# Patient Record
Sex: FEMALE | Race: WHITE | NOT HISPANIC OR LATINO | Employment: UNEMPLOYED | URBAN - METROPOLITAN AREA
[De-identification: names, ages, dates, MRNs, and addresses within clinical notes are randomized per-mention and may not be internally consistent; named-entity substitution may affect disease eponyms.]

---

## 2023-10-04 ENCOUNTER — LAB (OUTPATIENT)
Dept: LAB | Facility: LAB | Age: 33
End: 2023-10-04
Payer: COMMERCIAL

## 2023-10-04 DIAGNOSIS — G35 MULTIPLE SCLEROSIS (MULTI): ICD-10-CM

## 2023-10-04 DIAGNOSIS — D84.821 IMMUNODEFICIENCY DUE TO DRUGS (CODE) (MULTI): Primary | ICD-10-CM

## 2023-10-04 DIAGNOSIS — F41.8 OTHER SPECIFIED ANXIETY DISORDERS: ICD-10-CM

## 2023-10-04 DIAGNOSIS — Z11.59 ENCOUNTER FOR SCREENING FOR OTHER VIRAL DISEASES: ICD-10-CM

## 2023-10-04 LAB
25(OH)D3 SERPL-MCNC: 34 NG/ML (ref 30–100)
ALBUMIN SERPL BCP-MCNC: 4.6 G/DL (ref 3.4–5)
ALP SERPL-CCNC: 52 U/L (ref 33–110)
ALT SERPL W P-5'-P-CCNC: 14 U/L (ref 7–45)
ANION GAP SERPL CALC-SCNC: 14 MMOL/L (ref 10–20)
AST SERPL W P-5'-P-CCNC: 15 U/L (ref 9–39)
BASOPHILS # BLD AUTO: 0.07 X10*3/UL (ref 0–0.1)
BASOPHILS NFR BLD AUTO: 0.6 %
BILIRUB SERPL-MCNC: 0.7 MG/DL (ref 0–1.2)
BUN SERPL-MCNC: 11 MG/DL (ref 6–23)
CALCIUM SERPL-MCNC: 9.9 MG/DL (ref 8.6–10.6)
CHLORIDE SERPL-SCNC: 103 MMOL/L (ref 98–107)
CO2 SERPL-SCNC: 29 MMOL/L (ref 21–32)
CREAT SERPL-MCNC: 0.74 MG/DL (ref 0.5–1.05)
EOSINOPHIL # BLD AUTO: 0.05 X10*3/UL (ref 0–0.7)
EOSINOPHIL NFR BLD AUTO: 0.4 %
ERYTHROCYTE [DISTWIDTH] IN BLOOD BY AUTOMATED COUNT: 12.5 % (ref 11.5–14.5)
FOLATE SERPL-MCNC: 20 NG/ML
GFR SERPL CREATININE-BSD FRML MDRD: >90 ML/MIN/1.73M*2
GLUCOSE SERPL-MCNC: 90 MG/DL (ref 74–99)
HBV CORE AB SER QL: NONREACTIVE
HBV SURFACE AB SER-ACNC: >1000 MIU/ML
HBV SURFACE AG SERPL QL IA: NONREACTIVE
HCT VFR BLD AUTO: 38.9 % (ref 36–46)
HGB BLD-MCNC: 13.1 G/DL (ref 12–16)
IGA SERPL-MCNC: 248 MG/DL (ref 70–400)
IGG SERPL-MCNC: 984 MG/DL (ref 700–1600)
IGM SERPL-MCNC: 102 MG/DL (ref 40–230)
IMM GRANULOCYTES # BLD AUTO: 0.04 X10*3/UL (ref 0–0.7)
IMM GRANULOCYTES NFR BLD AUTO: 0.3 % (ref 0–0.9)
LYMPHOCYTES # BLD AUTO: 1.45 X10*3/UL (ref 1.2–4.8)
LYMPHOCYTES NFR BLD AUTO: 12.4 %
MCH RBC QN AUTO: 30 PG (ref 26–34)
MCHC RBC AUTO-ENTMCNC: 33.7 G/DL (ref 32–36)
MCV RBC AUTO: 89 FL (ref 80–100)
MONOCYTES # BLD AUTO: 0.85 X10*3/UL (ref 0.1–1)
MONOCYTES NFR BLD AUTO: 7.3 %
NEUTROPHILS # BLD AUTO: 9.2 X10*3/UL (ref 1.2–7.7)
NEUTROPHILS NFR BLD AUTO: 79 %
NRBC BLD-RTO: 0 /100 WBCS (ref 0–0)
PLATELET # BLD AUTO: 259 X10*3/UL (ref 150–450)
PMV BLD AUTO: 11.5 FL (ref 7.5–11.5)
POTASSIUM SERPL-SCNC: 3.4 MMOL/L (ref 3.5–5.3)
PROT SERPL-MCNC: 7.2 G/DL (ref 6.4–8.2)
RBC # BLD AUTO: 4.37 X10*6/UL (ref 4–5.2)
SODIUM SERPL-SCNC: 143 MMOL/L (ref 136–145)
T4 FREE SERPL-MCNC: 1.11 NG/DL (ref 0.78–1.48)
TSH SERPL-ACNC: 0.37 MIU/L (ref 0.44–3.98)
VIT B12 SERPL-MCNC: 526 PG/ML (ref 211–911)
WBC # BLD AUTO: 11.7 X10*3/UL (ref 4.4–11.3)

## 2023-10-04 PROCEDURE — 86481 TB AG RESPONSE T-CELL SUSP: CPT

## 2023-10-04 PROCEDURE — 36415 COLL VENOUS BLD VENIPUNCTURE: CPT

## 2023-10-05 NOTE — RESULT ENCOUNTER NOTE
Please let patient know that her TSH is slightly low but her T4 is fine. She may not need any interventions but she should inform her primary care doctor so that they can monitor.   Other labs are okay.     Thanks.

## 2023-10-06 LAB
CD19 CELLS # BLD: 0.17 X10E9/L
CD19 CELLS NFR BLD: 12 %
CD19+CD24++CD38++%: 1.8 %
CD19+CD24-CD38++%: 1.7 %
CD19+CD27+IGD+%: 28.8 %
CD19+CD27+IGD-%: 23 %
CD19+CD27-IGD+%: 44 %
FLOW CYTOMETRY SPECIALIST REVIEW: ABNORMAL
LYMPHOCYTES # SPEC AUTO: 1.45 X10*3/UL
NIL(NEG) CONTROL SPOT COUNT: NORMAL
PANEL A SPOT COUNT: 0
PANEL B SPOT COUNT: 0
PATH REVIEW, B CELL PHENOTYPING, EXTENDED: ABNORMAL
POS CONTROL SPOT COUNT: NORMAL
T-SPOT. TB INTERPRETATION: NEGATIVE

## 2023-10-07 PROBLEM — R20.0 NUMBNESS AND TINGLING OF BOTH FEET: Status: ACTIVE | Noted: 2020-06-22

## 2023-10-07 PROBLEM — D64.9 ANEMIA: Status: ACTIVE | Noted: 2020-06-22

## 2023-10-07 PROBLEM — E66.3 OVERWEIGHT: Status: ACTIVE | Noted: 2018-07-09

## 2023-10-07 PROBLEM — F43.22 ACUTE ADJUSTMENT DISORDER WITH ANXIETY: Status: ACTIVE | Noted: 2023-10-07

## 2023-10-07 PROBLEM — R20.2 NUMBNESS AND TINGLING OF BOTH FEET: Status: ACTIVE | Noted: 2020-06-22

## 2023-10-07 PROBLEM — M25.50 ARTHRALGIA: Status: ACTIVE | Noted: 2023-10-07

## 2023-10-07 PROBLEM — F32.A DEPRESSION: Status: ACTIVE | Noted: 2020-06-22

## 2023-10-07 PROBLEM — R45.86 MOOD SWING: Status: ACTIVE | Noted: 2020-06-22

## 2023-10-07 PROBLEM — Z86.32 HISTORY OF GESTATIONAL DIABETES: Status: ACTIVE | Noted: 2020-06-22

## 2023-10-07 PROBLEM — L40.9 PSORIASIS: Status: ACTIVE | Noted: 2018-07-09

## 2023-10-07 PROBLEM — D84.821 IMMUNODEFICIENCY DUE TO DRUG THERAPY (MULTI): Status: ACTIVE | Noted: 2023-10-07

## 2023-10-07 PROBLEM — Z79.899 IMMUNODEFICIENCY DUE TO DRUG THERAPY (MULTI): Status: ACTIVE | Noted: 2023-10-07

## 2023-10-07 PROBLEM — O99.212 OBESITY COMPLICATING PREGNANCY, SECOND TRIMESTER (HHS-HCC): Status: ACTIVE | Noted: 2021-02-22

## 2023-10-07 PROBLEM — F41.8 OTHER SPECIFIED ANXIETY DISORDERS: Status: ACTIVE | Noted: 2023-10-07

## 2023-10-07 PROBLEM — G35 MULTIPLE SCLEROSIS (MULTI): Status: ACTIVE | Noted: 2021-02-22

## 2023-10-07 PROBLEM — F41.9 ANXIETY: Status: ACTIVE | Noted: 2020-06-22

## 2023-10-07 PROBLEM — R63.2 INCREASED APPETITE: Status: ACTIVE | Noted: 2020-06-22

## 2023-10-07 PROBLEM — F17.200 TOBACCO USE DISORDER: Status: ACTIVE | Noted: 2018-07-09

## 2023-10-07 PROBLEM — M19.90 INFLAMMATORY ARTHRITIS: Status: ACTIVE | Noted: 2023-10-07

## 2023-10-07 RX ORDER — ACETAMINOPHEN 500 MG
TABLET ORAL
COMMUNITY
Start: 2022-01-21

## 2023-10-07 RX ORDER — DULOXETIN HYDROCHLORIDE 60 MG/1
60 CAPSULE, DELAYED RELEASE ORAL
COMMUNITY
Start: 2023-07-26 | End: 2023-11-17 | Stop reason: ALTCHOICE

## 2023-10-07 RX ORDER — SULFASALAZINE 500 MG/1
1 TABLET ORAL 3 TIMES DAILY
COMMUNITY
Start: 2022-06-28

## 2023-10-07 RX ORDER — SULFASALAZINE 500 MG/1
TABLET ORAL
COMMUNITY
Start: 2023-01-02

## 2023-10-07 RX ORDER — DIROXIMEL FUMARATE 231 MG/1
2 CAPSULE ORAL 2 TIMES DAILY
COMMUNITY
Start: 2021-07-22 | End: 2023-10-09 | Stop reason: SINTOL

## 2023-10-07 RX ORDER — HYDROXYCHLOROQUINE SULFATE 200 MG/1
1 TABLET, FILM COATED ORAL
COMMUNITY
Start: 2023-07-26

## 2023-10-09 ENCOUNTER — APPOINTMENT (OUTPATIENT)
Dept: BEHAVIORAL HEALTH | Facility: CLINIC | Age: 33
End: 2023-10-09
Payer: COMMERCIAL

## 2023-10-09 DIAGNOSIS — G35 MULTIPLE SCLEROSIS (MULTI): Primary | ICD-10-CM

## 2023-10-09 RX ORDER — OFATUMUMAB 20 MG/.4ML
20 INJECTION, SOLUTION SUBCUTANEOUS
Qty: 0.4 ML | Refills: 5 | Status: SHIPPED | OUTPATIENT
Start: 2023-10-09 | End: 2023-10-12 | Stop reason: SDUPTHER

## 2023-10-09 RX ORDER — OFATUMUMAB 20 MG/.4ML
20 INJECTION, SOLUTION SUBCUTANEOUS
Qty: 1.2 ML | Refills: 0 | Status: SHIPPED | OUTPATIENT
Start: 2023-10-09 | End: 2023-10-12 | Stop reason: SDUPTHER

## 2023-10-09 NOTE — PROGRESS NOTES
Disease Modifying Therapy Initiation Pharmacist Consultation Visit    Spoke to patient over the phone at the request of Som Benson DNP to discuss the initiation of a new disease modifying therapy for treatment of the patient's multiple sclerosis.    DMTs discussed: Ocrevus, Kesimpta, Zeposia    All pertinent counseling points of the medications listed above were discussed with the patient, including dosing, route of administration, warnings and precautions, side effects, and goals of therapy. Pregnancy and vaccine considerations were also discussed, if applicable. Financial assistance options for each therapy were introduced as well. All patient questions and concerns were addressed during this visit.     Patient has decided on Kesimpta therapy during their consultation visit. I explained the logistics of initiating the medication and had the patient sign the start form, if applicable. All appropriate baseline labs/tests have been reviewed and/or will be ordered and reviewed prior to dispensing of therapy.    DMT selected: Kesimpta    The insurance authorization process will be initiated and prescription/orders will be sent to the corresponding pharmacy pursuant to pharmacist-provider collaborative practice agreement. Patient was provided with my contact information and was asked to contact me directly with any follow up questions/concerns.    Taurus Bill, PharmD, BCPS, MSCS  Clinical Pharmacy Specialist- Neurology/MS  [facility]  Phone: [phone]  Fax: [phone]

## 2023-10-12 ENCOUNTER — SPECIALTY PHARMACY (OUTPATIENT)
Dept: PHARMACY | Facility: CLINIC | Age: 33
End: 2023-10-12

## 2023-10-12 DIAGNOSIS — G35 MULTIPLE SCLEROSIS (MULTI): ICD-10-CM

## 2023-10-12 RX ORDER — OFATUMUMAB 20 MG/.4ML
20 INJECTION, SOLUTION SUBCUTANEOUS
Qty: 0.4 ML | Refills: 5 | Status: SHIPPED | OUTPATIENT
Start: 2023-10-12 | End: 2023-12-28 | Stop reason: SDUPTHER

## 2023-10-12 RX ORDER — OFATUMUMAB 20 MG/.4ML
20 INJECTION, SOLUTION SUBCUTANEOUS
Qty: 1.2 ML | Refills: 0 | Status: SHIPPED | OUTPATIENT
Start: 2023-10-12 | End: 2023-12-28 | Stop reason: ALTCHOICE

## 2023-11-17 ENCOUNTER — TELEMEDICINE (OUTPATIENT)
Dept: BEHAVIORAL HEALTH | Facility: CLINIC | Age: 33
End: 2023-11-17
Payer: COMMERCIAL

## 2023-11-17 DIAGNOSIS — F41.1 GAD (GENERALIZED ANXIETY DISORDER): ICD-10-CM

## 2023-11-17 PROCEDURE — 99203 OFFICE O/P NEW LOW 30 MIN: CPT | Performed by: PSYCHIATRY & NEUROLOGY

## 2023-11-17 RX ORDER — BUSPIRONE HYDROCHLORIDE 5 MG/1
TABLET ORAL
Qty: 120 TABLET | Refills: 2 | Status: SHIPPED | OUTPATIENT
Start: 2023-11-17

## 2023-11-17 NOTE — PATIENT INSTRUCTIONS
Plan:   - Start buspirone 5mg twice daily. Contact the office in case of any side effects or concerns. Otherwise, please update me in about 2 weeks and we can consider adjusting dose.   - Continue psychotherapy.   - Healthy lifestyle.   - Follow up in 6 weeks.   - Call sooner ([phone]) in case of any questions or concerns.  - Call [phone] for mental health crisis or suicidal thoughts, or call 911 or go to the nearest emergency room for emergencies.
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
## 2023-12-21 ENCOUNTER — SPECIALTY PHARMACY (OUTPATIENT)
Dept: PHARMACY | Facility: CLINIC | Age: 33
End: 2023-12-21

## 2023-12-27 ENCOUNTER — TELEPHONE (OUTPATIENT)
Dept: NEUROLOGY | Facility: CLINIC | Age: 33
End: 2023-12-27
Payer: COMMERCIAL

## 2023-12-27 DIAGNOSIS — G35 MULTIPLE SCLEROSIS (MULTI): ICD-10-CM

## 2023-12-28 RX ORDER — OFATUMUMAB 20 MG/.4ML
20 INJECTION, SOLUTION SUBCUTANEOUS
Qty: 0.4 ML | Refills: 5 | Status: SHIPPED | OUTPATIENT
Start: 2023-12-28

## 2023-12-28 RX ORDER — OFATUMUMAB 20 MG/.4ML
20 INJECTION, SOLUTION SUBCUTANEOUS
Qty: 0.4 ML | Refills: 5 | Status: SHIPPED | OUTPATIENT
Start: 2023-12-28 | End: 2023-12-28 | Stop reason: SDUPTHER

## 2024-04-11 ENCOUNTER — TELEPHONE (OUTPATIENT)
Dept: NEUROLOGY | Facility: CLINIC | Age: 34
End: 2024-04-11
Payer: COMMERCIAL

## 2024-04-11 NOTE — TELEPHONE ENCOUNTER
Pt left a message on the refill line that she has questions for COCO Benson. I called back, pt did not answer - I left a detailed VM to [place] questions via [employer] as the best way to communicate. I also left gave her phone number for CS as she hasn't been seen since Sept 2023 and has no FUV scheduled.

## 2024-06-24 ENCOUNTER — TELEPHONE (OUTPATIENT)
Dept: NEUROLOGY | Facility: CLINIC | Age: 34
End: 2024-06-24
Payer: COMMERCIAL

## 2024-06-24 NOTE — TELEPHONE ENCOUNTER
Catalina called. She has not been in for a visit in a while. She needs updated MRI and Labs. She is doing well but wants to make sure she is compliant with Kesimpta.

## 2024-06-25 DIAGNOSIS — G35 MULTIPLE SCLEROSIS (MULTI): Primary | ICD-10-CM

## 2024-07-31 ENCOUNTER — OFFICE VISIT (OUTPATIENT)
Dept: RHEUMATOLOGY | Facility: CLINIC | Age: 34
End: 2024-07-31
Payer: COMMERCIAL

## 2024-07-31 VITALS — WEIGHT: 217 LBS | BODY MASS INDEX: 32.99 KG/M2

## 2024-07-31 DIAGNOSIS — L40.50 PSORIATIC ARTHRITIS (MULTI): Primary | ICD-10-CM

## 2024-07-31 PROCEDURE — 99214 OFFICE O/P EST MOD 30 MIN: CPT | Performed by: INTERNAL MEDICINE

## 2024-08-01 NOTE — PROGRESS NOTES
Informants: Patient and EMR.  PP: 33-year-old female with a history of multiple sclerosis psoriasis and psoriatic arthritis.  CC: Joint pain.  [place]: She was last seen in rheumatology department 2022.  At that time she was taking sulfasalazine 500 mg every morning and 1000 mg every evening.  Folic acid was added because of mouth sores and hair loss.  Since that time she has discontinued sulfasalazine and folic acid because of improvement in musculoskeletal symptoms.  She recently has noted recurrence of the musculoskeletal pain particularly involving her knees and hands as well as some discomfort involving her lower back and hips.  She did not resume the sulfasalazine because her medication for multiple sclerosis was changed from Vumerity to Ofatumumab.  She was concerned about possible contraindications to resuming the sulfasalazine.  She has not noted any rashes, uveitis symptoms, change in bowel or bladder habits, or mucosal ulcerations.  PH: Allergies: No known drug allergies. Illnesses: Psoriasis involving her feet and occasionally her hands for the past 6 years. She has multiple sclerosis that was diagnosed  when she presented with vertigo; surgeries: 4  sections the last 1 also included tubal ligation.  SH: She quit tobacco smoking. She drinks alcohol occasionally. She smokes marijuana occasionally. She is employed as a  and phlebotomy, anesthesia, and neutering. She also has pet goats.  FH: Her father is healthy. Her mother had brain aneurysm, liver problems, hypercholesterolemia, psoriasis. She has a sister who is healthy. She has 4 daughters all of whom are healthy. She has no brothers and no sons.  PX: She is a well-developed, well-nourished, white female. The lungs, heart, abdomen, and extremities are benign. The sclera are clear. The musculoskeletal examination shows fusiform swelling with tenderness and slightly increased warmth of the PIP joint of the fifth digit  of the left hand. There is hyperextension of the MCP joint of the thumbs. There is slight puffiness in the mid dorsal aspect of the right hand with mild tenderness at the right third and fourth MCP joints. The elbows, shoulders, and ankles are not swollen.  There is slight crepitus on range of motion of the left knee without joint laxity or joint effusion.  Impression: She has a 33-year-old female with history of multiple sclerosis, positive HERO virus antibody, psoriasis, psoriatic arthritis with mild exacerbation of the psoriatic arthritis.  She wants to try alternative therapies prior to resuming pharmacologic therapy for the arthritis.  Plan: She is to try omega-3 fatty acid 1000 mg daily and turmeric for 2 weeks.  She is to resume sulfasalazine at 500 mg twice per day and folic acid 800 mcg once per day if she does not notice any improvement in musculoskeletal symptoms with taking herbal supplements.  She is to return at the next available office appointment.

## 2024-08-14 ENCOUNTER — APPOINTMENT (OUTPATIENT)
Dept: LAB | Facility: LAB | Age: 34
End: 2024-08-14
Payer: COMMERCIAL

## 2024-08-14 ENCOUNTER — HOSPITAL ENCOUNTER (OUTPATIENT)
Dept: RADIOLOGY | Facility: CLINIC | Age: 34
Discharge: HOME | End: 2024-08-14
Payer: COMMERCIAL

## 2024-08-14 ENCOUNTER — LAB (OUTPATIENT)
Dept: LAB | Facility: LAB | Age: 34
End: 2024-08-14
Payer: COMMERCIAL

## 2024-08-14 DIAGNOSIS — G35 MULTIPLE SCLEROSIS (MULTI): ICD-10-CM

## 2024-08-14 LAB
BASOPHILS # BLD AUTO: 0.08 X10*3/UL (ref 0–0.1)
BASOPHILS NFR BLD AUTO: 0.8 %
EOSINOPHIL # BLD AUTO: 0.16 X10*3/UL (ref 0–0.7)
EOSINOPHIL NFR BLD AUTO: 1.7 %
ERYTHROCYTE [DISTWIDTH] IN BLOOD BY AUTOMATED COUNT: 13.9 % (ref 11.5–14.5)
HCT VFR BLD AUTO: 36.6 % (ref 36–46)
HGB BLD-MCNC: 12.3 G/DL (ref 12–16)
IMM GRANULOCYTES # BLD AUTO: 0.03 X10*3/UL (ref 0–0.7)
IMM GRANULOCYTES NFR BLD AUTO: 0.3 % (ref 0–0.9)
LYMPHOCYTES # BLD AUTO: 1.57 X10*3/UL (ref 1.2–4.8)
LYMPHOCYTES NFR BLD AUTO: 16.3 %
MCH RBC QN AUTO: 29.4 PG (ref 26–34)
MCHC RBC AUTO-ENTMCNC: 33.6 G/DL (ref 32–36)
MCV RBC AUTO: 88 FL (ref 80–100)
MONOCYTES # BLD AUTO: 0.75 X10*3/UL (ref 0.1–1)
MONOCYTES NFR BLD AUTO: 7.8 %
NEUTROPHILS # BLD AUTO: 7.05 X10*3/UL (ref 1.2–7.7)
NEUTROPHILS NFR BLD AUTO: 73.1 %
NRBC BLD-RTO: 0 /100 WBCS (ref 0–0)
PLATELET # BLD AUTO: 229 X10*3/UL (ref 150–450)
RBC # BLD AUTO: 4.18 X10*6/UL (ref 4–5.2)
WBC # BLD AUTO: 9.6 X10*3/UL (ref 4.4–11.3)

## 2024-08-14 PROCEDURE — 82784 ASSAY IGA/IGD/IGG/IGM EACH: CPT

## 2024-08-14 PROCEDURE — A9575 INJ GADOTERATE MEGLUMI 0.1ML: HCPCS | Performed by: PSYCHIATRY & NEUROLOGY

## 2024-08-14 PROCEDURE — 88184 FLOWCYTOMETRY/ TC 1 MARKER: CPT

## 2024-08-14 PROCEDURE — 70553 MRI BRAIN STEM W/O & W/DYE: CPT | Performed by: RADIOLOGY

## 2024-08-14 PROCEDURE — 2550000001 HC RX 255 CONTRASTS: Performed by: PSYCHIATRY & NEUROLOGY

## 2024-08-14 PROCEDURE — 36415 COLL VENOUS BLD VENIPUNCTURE: CPT

## 2024-08-14 PROCEDURE — 70553 MRI BRAIN STEM W/O & W/DYE: CPT

## 2024-08-14 PROCEDURE — 85025 COMPLETE CBC W/AUTO DIFF WBC: CPT

## 2024-08-14 PROCEDURE — 88185 FLOWCYTOMETRY/TC ADD-ON: CPT

## 2024-08-14 RX ORDER — GADOTERATE MEGLUMINE 376.9 MG/ML
20 INJECTION INTRAVENOUS
Status: COMPLETED | OUTPATIENT
Start: 2024-08-14 | End: 2024-08-14

## 2024-08-15 LAB
IGA SERPL-MCNC: 240 MG/DL (ref 70–400)
IGG SERPL-MCNC: 976 MG/DL (ref 700–1600)
IGM SERPL-MCNC: 68 MG/DL (ref 40–230)

## 2024-08-16 LAB
CD19 CELLS # BLD: 0 X10E9/L
CD19 CELLS NFR BLD: 0 %
FLOW CYTOMETRY SPECIALIST REVIEW: ABNORMAL
LYMPHOCYTES # SPEC AUTO: 1.57 X10*3/UL
PATH REVIEW, B CELL PHENOTYPING, EXTENDED: ABNORMAL

## 2024-09-26 ENCOUNTER — APPOINTMENT (OUTPATIENT)
Dept: NEUROLOGY | Facility: CLINIC | Age: 34
End: 2024-09-26
Payer: COMMERCIAL

## 2024-09-26 ENCOUNTER — OFFICE VISIT (OUTPATIENT)
Dept: NEUROLOGY | Facility: CLINIC | Age: 34
End: 2024-09-26
Payer: COMMERCIAL

## 2024-09-26 VITALS
WEIGHT: 207 LBS | BODY MASS INDEX: 32.42 KG/M2 | DIASTOLIC BLOOD PRESSURE: 59 MMHG | HEART RATE: 75 BPM | SYSTOLIC BLOOD PRESSURE: 113 MMHG | RESPIRATION RATE: 18 BRPM

## 2024-09-26 DIAGNOSIS — G35 MULTIPLE SCLEROSIS (MULTI): Primary | ICD-10-CM

## 2024-09-26 PROCEDURE — 3078F DIAST BP <80 MM HG: CPT | Performed by: NURSE PRACTITIONER

## 2024-09-26 PROCEDURE — 99215 OFFICE O/P EST HI 40 MIN: CPT | Performed by: NURSE PRACTITIONER

## 2024-09-26 PROCEDURE — 1036F TOBACCO NON-USER: CPT | Performed by: NURSE PRACTITIONER

## 2024-09-26 PROCEDURE — 3074F SYST BP LT 130 MM HG: CPT | Performed by: NURSE PRACTITIONER

## 2024-09-26 ASSESSMENT — PAIN SCALES - GENERAL: PAINLEVEL: 0-NO PAIN

## 2024-09-27 DIAGNOSIS — G35 MULTIPLE SCLEROSIS (MULTI): ICD-10-CM

## 2024-09-27 RX ORDER — OFATUMUMAB 20 MG/.4ML
INJECTION, SOLUTION SUBCUTANEOUS
Qty: 1 ML | Refills: 5 | Status: SHIPPED | OUTPATIENT
Start: 2024-09-27

## 2025-02-05 ENCOUNTER — APPOINTMENT (OUTPATIENT)
Dept: RHEUMATOLOGY | Facility: CLINIC | Age: 35
End: 2025-02-05
Payer: COMMERCIAL

## 2025-02-05 VITALS
DIASTOLIC BLOOD PRESSURE: 60 MMHG | HEART RATE: 63 BPM | SYSTOLIC BLOOD PRESSURE: 129 MMHG | BODY MASS INDEX: 31.64 KG/M2 | WEIGHT: 202 LBS

## 2025-02-05 DIAGNOSIS — L40.50 PSORIATIC ARTHRITIS (MULTI): Primary | ICD-10-CM

## 2025-02-05 PROCEDURE — 3078F DIAST BP <80 MM HG: CPT | Performed by: INTERNAL MEDICINE

## 2025-02-05 PROCEDURE — 99214 OFFICE O/P EST MOD 30 MIN: CPT | Performed by: INTERNAL MEDICINE

## 2025-02-05 PROCEDURE — 3074F SYST BP LT 130 MM HG: CPT | Performed by: INTERNAL MEDICINE

## 2025-02-05 RX ORDER — METHOCARBAMOL 500 MG/1
500 TABLET, FILM COATED ORAL 2 TIMES DAILY PRN
Qty: 60 TABLET | Refills: 8 | Status: SHIPPED | OUTPATIENT
Start: 2025-02-05 | End: 2026-02-05

## 2025-02-06 NOTE — PROGRESS NOTES
She complains of significant muscle tightness and the back of her thighs and calves despite doing stretching exercises.  She has lost weight with changing her diet.  She takes Symptom injections for treatment of multiple sclerosis.  She has not been taking the sulfasalazine.  She has been taking herbal supplements.  She has not noted any significant joint swelling.    There is good passive range of motion of upper and lower extremity joints without joint effusions.  There is good passive range of motion of the neck.  There is no peripheral edema.  There is discomfort in the posterior aspect of the knees with full flexion.    Laboratory (8/14/2024) WBC 9.6, hemoglobin 12.3, hematocrit 36.6, MCV 88, MCHC 33.6, platelets 229, IgG 976, IgA 240, IgM 68.  MRI brain (8/14/2024) changes in level of the supratentorial and infratentorial white matter consistent with demyelinating disease.    She has history of multiple sclerosis, positive HERO virus PCR, psoriasis, psoriatic arthritis.    She is to take methocarbamol 500 mg once or twice per day as needed for muscle spasms.  She is to continue with stretching exercises.  Will hold off on disease modifying antirheumatic medications for now.  She is to return at the next available office appointment.

## 2025-03-20 ENCOUNTER — TELEMEDICINE (OUTPATIENT)
Dept: NEUROLOGY | Facility: CLINIC | Age: 35
End: 2025-03-20
Payer: COMMERCIAL

## 2025-03-20 DIAGNOSIS — G35 MULTIPLE SCLEROSIS (MULTI): Primary | ICD-10-CM

## 2025-03-20 PROCEDURE — 99213 OFFICE O/P EST LOW 20 MIN: CPT | Performed by: NURSE PRACTITIONER

## 2025-03-20 PROCEDURE — 99213 OFFICE O/P EST LOW 20 MIN: CPT | Mod: 95 | Performed by: NURSE PRACTITIONER

## 2025-03-20 NOTE — PROGRESS NOTES
Expand All Collapse All    Onset: 12/2020  Diagnosis of MS: 12/2020  Disease course at onset: RRMS  Current disease course: RRMS  Previous disease therapies: Vumerity  Current disease therapies: Kesimpta  Most recent MRI brain: 8/2024- stable  Most recent MRI cervical spine: date  Most recent MRI thoracic spine: date  CSF: date  JCV serology and date:   CBC: date WBC:  ALC:         Subjective   Catalina Helton is a 34 y.o. right hand  female agreed to a virtual visit for a follow up of  her MS, she takes Kesimpta and tolerating well, but missed Jan. And Feb. Dose d/t insurance issues.  Last MRI of the brain was August 2024 and stable. She reports no new MS symptoms.    Objective   Neurological Exam  Mental Status  Alert. Oriented to person, place, time and situation. Oriented to person, place, and time. Speech is normal. Language is fluent with no aphasia. Attention and concentration are normal.    Cranial Nerves  CN III, IV, VI: Normal lids and orbits bilaterally.  CN VII: Full and symmetric facial movement.  CN XI: Shoulder shrug strength is normal.  CN XII: Tongue midline without atrophy or fasciculations.    Reflexes   Right palmar grasp present. Left palmar grasp present.    Coordination    Finger-to-nose, rapid alternating movements and heel-to-shin normal bilaterally without dysmetria.    Gait  Casual gait is normal including stance, stride, and arm swing.    Physical Exam  Constitutional:       Appearance: Normal appearance.   HENT:      Head: Normocephalic.      Nose: Nose normal.   Eyes:      General: Lids are normal.   Pulmonary:      Effort: Pulmonary effort is normal.   Musculoskeletal:         General: Normal range of motion.      Cervical back: Full passive range of motion without pain and normal range of motion.   Neurological:      Mental Status: She is alert and oriented to person, place, and time.      Coordination: Coordination is intact.   Psychiatric:         Attention and Perception: Attention  normal.         Mood and Affect: Mood normal.         Speech: Speech normal.         Behavior: Behavior normal. Behavior is cooperative.         Thought Content: Thought content normal.         Cognition and Memory: Cognition normal.         Judgment: Judgment normal.     Provider Impression  Catalina Helton is a 34 y.o. right hand  female agreed to a virtual visit for a follow up of  her MS, she takes Kesimpta and tolerating well, but missed Jan. And Feb. Dose d/t insurance issues.  Last MRI of the brain was August 2024 and stable.    We discussed the importance of living healthy life style with diet and exercise and the importance of V-D in patient's with MS.    The total virtual visit was 25 minutes and more than 50% of the visit was spent counseling and coordination of care.    I personally reviewed laboratory, radiographic, and medical studies which were pertinent for today's virtual visit.    Plan  - Continue Kesimpta.  - Labs today.  - MRI August 2025.  - Follow up 6 months.

## 2025-08-08 ENCOUNTER — OFFICE VISIT (OUTPATIENT)
Dept: PRIMARY CARE | Facility: CLINIC | Age: 35
End: 2025-08-08
Payer: COMMERCIAL

## 2025-08-08 VITALS
DIASTOLIC BLOOD PRESSURE: 70 MMHG | BODY MASS INDEX: 29.91 KG/M2 | RESPIRATION RATE: 16 BRPM | TEMPERATURE: 97.8 F | WEIGHT: 190.6 LBS | SYSTOLIC BLOOD PRESSURE: 140 MMHG | OXYGEN SATURATION: 97 % | HEIGHT: 67 IN | HEART RATE: 72 BPM

## 2025-08-08 DIAGNOSIS — Z00.00 ANNUAL PHYSICAL EXAM: Primary | ICD-10-CM

## 2025-08-08 DIAGNOSIS — G35 MULTIPLE SCLEROSIS (MULTI): ICD-10-CM

## 2025-08-08 DIAGNOSIS — F43.22 ADJUSTMENT DISORDER WITH ANXIOUS MOOD: ICD-10-CM

## 2025-08-08 PROBLEM — F32.A DEPRESSION: Status: RESOLVED | Noted: 2020-06-22 | Resolved: 2025-08-08

## 2025-08-08 PROBLEM — D84.821 IMMUNODEFICIENCY DUE TO DRUG THERAPY (MULTI): Status: RESOLVED | Noted: 2023-10-07 | Resolved: 2025-08-08

## 2025-08-08 PROBLEM — R45.86 MOOD SWING: Status: RESOLVED | Noted: 2020-06-22 | Resolved: 2025-08-08

## 2025-08-08 PROBLEM — M25.50 ARTHRALGIA: Status: RESOLVED | Noted: 2023-10-07 | Resolved: 2025-08-08

## 2025-08-08 PROBLEM — Z79.899 IMMUNODEFICIENCY DUE TO DRUG THERAPY (MULTI): Status: RESOLVED | Noted: 2023-10-07 | Resolved: 2025-08-08

## 2025-08-08 PROBLEM — Z86.32 HISTORY OF GESTATIONAL DIABETES: Status: RESOLVED | Noted: 2020-06-22 | Resolved: 2025-08-08

## 2025-08-08 PROBLEM — R20.2 NUMBNESS AND TINGLING OF BOTH FEET: Status: RESOLVED | Noted: 2020-06-22 | Resolved: 2025-08-08

## 2025-08-08 PROBLEM — M19.90 ARTHRITIS: Status: ACTIVE | Noted: 2025-08-08

## 2025-08-08 PROBLEM — R63.2 INCREASED APPETITE: Status: RESOLVED | Noted: 2020-06-22 | Resolved: 2025-08-08

## 2025-08-08 PROBLEM — M19.90 ARTHRITIS: Status: RESOLVED | Noted: 2025-08-08 | Resolved: 2025-08-08

## 2025-08-08 PROBLEM — O99.212 OBESITY COMPLICATING PREGNANCY, SECOND TRIMESTER (HHS-HCC): Status: RESOLVED | Noted: 2021-02-22 | Resolved: 2025-08-08

## 2025-08-08 PROBLEM — F17.200 TOBACCO USE DISORDER: Status: RESOLVED | Noted: 2018-07-09 | Resolved: 2025-08-08

## 2025-08-08 PROBLEM — L40.9 PSORIASIS: Status: RESOLVED | Noted: 2018-07-09 | Resolved: 2025-08-08

## 2025-08-08 PROBLEM — F41.8 OTHER SPECIFIED ANXIETY DISORDERS: Status: RESOLVED | Noted: 2023-10-07 | Resolved: 2025-08-08

## 2025-08-08 PROBLEM — F41.9 ANXIETY: Status: RESOLVED | Noted: 2020-06-22 | Resolved: 2025-08-08

## 2025-08-08 PROBLEM — E66.3 OVERWEIGHT: Status: RESOLVED | Noted: 2018-07-09 | Resolved: 2025-08-08

## 2025-08-08 PROBLEM — R20.0 NUMBNESS AND TINGLING OF BOTH FEET: Status: RESOLVED | Noted: 2020-06-22 | Resolved: 2025-08-08

## 2025-08-08 PROCEDURE — 3008F BODY MASS INDEX DOCD: CPT

## 2025-08-08 PROCEDURE — 99204 OFFICE O/P NEW MOD 45 MIN: CPT

## 2025-08-08 RX ORDER — HYDROXYZINE PAMOATE 50 MG/1
50 CAPSULE ORAL 4 TIMES DAILY PRN
Qty: 30 CAPSULE | Refills: 0 | Status: SHIPPED | OUTPATIENT
Start: 2025-08-08 | End: 2025-08-18

## 2025-08-08 RX ORDER — FLUOXETINE 10 MG/1
10 CAPSULE ORAL DAILY
Qty: 30 CAPSULE | Refills: 1 | Status: SHIPPED | OUTPATIENT
Start: 2025-08-08 | End: 2025-10-07

## 2025-08-08 ASSESSMENT — PATIENT HEALTH QUESTIONNAIRE - PHQ9
SUM OF ALL RESPONSES TO PHQ9 QUESTIONS 1 AND 2: 0
1. LITTLE INTEREST OR PLEASURE IN DOING THINGS: NOT AT ALL
2. FEELING DOWN, DEPRESSED OR HOPELESS: NOT AT ALL

## 2025-08-08 ASSESSMENT — ENCOUNTER SYMPTOMS
DEPRESSION: 0
LOSS OF SENSATION IN FEET: 0
OCCASIONAL FEELINGS OF UNSTEADINESS: 0

## 2025-08-08 NOTE — ASSESSMENT & PLAN NOTE
High anxiety d/t 's JOCE and recent entrance into treatment  Main side effect concern is weight gain. Start prozac today 10, can incr to 20 if tolerating  Hydroxyzine prn for more immediate anxiety relief

## 2025-08-08 NOTE — ASSESSMENT & PLAN NOTE
Lab Results   Component Value Date    WBC 9.6 08/14/2024    HGB 12.3 08/14/2024    HCT 36.6 08/14/2024    MCV 88 08/14/2024     08/14/2024   Stable, cont to monitor

## 2025-08-08 NOTE — PATIENT INSTRUCTIONS
"Thank you for trusting me with your care!  Please let me know if you have any questions by sending me a message through [employer]. In [employer], select \"Messages\", \"Send a message\", then \"Send a message to your provider's team\". Alternatively, please call the [facility] at [phone].    "

## 2025-08-08 NOTE — PROGRESS NOTES
"  PRIMARY CARE CLINIC NOTE    SUBJECTIVE:  Chief Complaint   Patient presents with    Establish Care     Pt would like discuss anxiety and medications as well if needed        HPI:  Catalina Helton is a 34 y.o. female presenting in clinic today to establish care.     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 Has children ages 4,6,7,9  No SI       Health Maintenance Due   Topic Date Due    Yearly Adult Physical  Never done    Lipid Panel  Never done    COVID-19 Vaccine (1) Never done    Pneumococcal Vaccine: Pediatrics and At-Risk Adult Patients (1 of 2 - PCV) Never done    Zoster Vaccines (1 of 2) 09/09/2011    Cervical Cancer Screening  Never done    DTaP/Tdap/Td Vaccines (9 - Td or Tdap) 07/10/2025    Influenza Vaccine (1) 09/01/2025         Medications, Past Medical, Surgical, Social, and Family History have been reviewed with the patient, and updated where appropriate. Please see relevant sections in Epic EMR for details.     OBJECTIVE:  /70 (BP Location: Left arm, Patient Position: Sitting, BP Cuff Size: Large adult)   Pulse 72   Temp 36.6 °C (97.8 °F) (Temporal)   Resp 16   Ht 1.702 m (5' 7\")   Wt 86.5 kg (190 lb 9.6 oz)   SpO2 97%   BMI 29.85 kg/m²   Vital signs reviewed.    Physical Examination:  GENERAL: Well-appearing female, in no acute distress.  HEENT: Normocephalic, atraumatic. EOMI, Conjunctivae clear. MMM.  CV: RRR. +S1/S2.  LUNGS: CTAB. Normal respiratory effort.  ABDOMEN: Soft, non-distended, and nontender. Normal bowel sounds in all 4 quadrants.  BACK: No tenderness or deformity of the spine. No CVA tenderness.  MSK: No gross joint deformities. Full passive and active ROM of B/L upper and lower extremities.  NEURO: A&Ox3. CN II-XII grossly intact. Normal speech. Normal gait.   EXTREMITIES/SKIN: Warm and well-perfused without deformities or edema. No rash or abnormal appearing lesions on exposed skin. No pallor or jaundice.  Mental Status " Exam  General Appearance:   Mild distress.     Level of Consciousness:  Alert.    Speech:    Normal volume. Rapid.     Language:    Normal.     Affect:    Labile.     Thought Process and Associations:    Logical.     Thought Content:    Normal. No suicidal ideation, no homicidal ideation and not actively hallucinating. No delusions.    Attention Span:    Distracted.     Insight:    Poor.     Judgment:    Fair.           Relevant labs, imaging and studies as detailed above.    The ASCVD Risk score (Khadra SWEET, et al., 2019) failed to calculate for the following reasons:    The 2019 ASCVD risk score is only valid for ages 40 to 79  Patient Health Questionnaire-2 Score: 0 (8/8/2025 11:19 AM)      Patient Health Questionnaire-2 Score: 0 (8/8/2025 11:19 AM)    This may not meet the criteria for a clinical depression diagnosis. Symptoms were reviewed with Catalina.  Follow-up within the next 3 months is recommended to re-assess symptoms and monitor mental health status.      Computed FIB-4 Calculation unavailable. One or more values for this score either were not found within the given timeframe or did not fit some other criterion.      ASSESSMENT/PLAN:  Catalina Helton is a 34 y.o. female presenting to establish care.    Problem List Items Addressed This Visit          Mental Health    Adjustment disorder with anxious mood    Relevant Medications    hydrOXYzine pamoate (VistariL) 50 mg capsule    FLUoxetine (PROzac) 10 mg capsule    Other Relevant Orders    Follow Up In [facility] - PCP       Neuro    Multiple sclerosis (Multi)    Current Assessment & Plan   No longer on therapy  Encouraged to re-est with neuro to get back on therapy, though pt declines symptoms at this time          Other Visit Diagnoses         Annual physical exam    -  Primary             To-Do:  [ ] anxiety fu    Future Appointments   Date Time Provider Department Center   8/13/2025  9:40 AM Victor Hugo Leblanc MD [id number] [place]   9/5/2025 11:30  AM Perla Perez, DO [id number] Toni Perez, DO

## 2025-08-08 NOTE — ASSESSMENT & PLAN NOTE
No longer on therapy  Encouraged to re-est with neuro to get back on therapy, though pt declines symptoms at this time

## 2025-08-13 ENCOUNTER — APPOINTMENT (OUTPATIENT)
Dept: RHEUMATOLOGY | Facility: CLINIC | Age: 35
End: 2025-08-13
Payer: COMMERCIAL

## 2025-08-26 ENCOUNTER — APPOINTMENT (OUTPATIENT)
Dept: RADIOLOGY | Facility: CLINIC | Age: 35
End: 2025-08-26
Payer: COMMERCIAL

## 2025-09-05 ENCOUNTER — APPOINTMENT (OUTPATIENT)
Dept: PRIMARY CARE | Facility: CLINIC | Age: 35
End: 2025-09-05
Payer: COMMERCIAL

## 2025-09-17 ENCOUNTER — APPOINTMENT (OUTPATIENT)
Dept: PRIMARY CARE | Facility: CLINIC | Age: 35
End: 2025-09-17
Payer: COMMERCIAL

## 2025-11-12 ENCOUNTER — APPOINTMENT (OUTPATIENT)
Dept: RHEUMATOLOGY | Facility: CLINIC | Age: 35
End: 2025-11-12
Payer: COMMERCIAL